# Patient Record
Sex: FEMALE | Race: OTHER
[De-identification: names, ages, dates, MRNs, and addresses within clinical notes are randomized per-mention and may not be internally consistent; named-entity substitution may affect disease eponyms.]

---

## 2020-01-23 ENCOUNTER — RESULT REVIEW (OUTPATIENT)
Age: 70
End: 2020-01-23

## 2020-01-23 PROCEDURE — 88321 CONSLTJ&REPRT SLD PREP ELSWR: CPT

## 2020-01-24 ENCOUNTER — OUTPATIENT (OUTPATIENT)
Dept: OUTPATIENT SERVICES | Facility: HOSPITAL | Age: 70
LOS: 1 days | End: 2020-01-24
Payer: MEDICARE

## 2020-01-24 DIAGNOSIS — D05.12 INTRADUCTAL CARCINOMA IN SITU OF LEFT BREAST: ICD-10-CM

## 2020-01-24 LAB — SURGICAL PATHOLOGY STUDY: SIGNIFICANT CHANGE UP

## 2020-01-24 PROCEDURE — 88321 CONSLTJ&REPRT SLD PREP ELSWR: CPT

## 2020-02-07 PROBLEM — Z00.00 ENCOUNTER FOR PREVENTIVE HEALTH EXAMINATION: Status: ACTIVE | Noted: 2020-02-07

## 2020-02-10 ENCOUNTER — APPOINTMENT (OUTPATIENT)
Dept: MAMMOGRAPHY | Facility: HOSPITAL | Age: 70
End: 2020-02-10
Payer: MEDICARE

## 2020-02-10 ENCOUNTER — OUTPATIENT (OUTPATIENT)
Dept: OUTPATIENT SERVICES | Facility: HOSPITAL | Age: 70
LOS: 1 days | End: 2020-02-10
Payer: MEDICARE

## 2020-02-10 DIAGNOSIS — Z98.890 OTHER SPECIFIED POSTPROCEDURAL STATES: Chronic | ICD-10-CM

## 2020-02-10 DIAGNOSIS — Z98.82 BREAST IMPLANT STATUS: Chronic | ICD-10-CM

## 2020-02-10 DIAGNOSIS — Z98.891 HISTORY OF UTERINE SCAR FROM PREVIOUS SURGERY: Chronic | ICD-10-CM

## 2020-02-10 PROCEDURE — 19281 PERQ DEVICE BREAST 1ST IMAG: CPT | Mod: LT

## 2020-02-10 PROCEDURE — C1739: CPT

## 2020-02-10 PROCEDURE — 19281 PERQ DEVICE BREAST 1ST IMAG: CPT

## 2020-02-10 NOTE — ASU PATIENT PROFILE, ADULT - PSH
History of bilateral breast implants    History of     History of surgery  left breast implant abruption

## 2020-02-11 ENCOUNTER — RESULT REVIEW (OUTPATIENT)
Age: 70
End: 2020-02-11

## 2020-02-11 ENCOUNTER — OUTPATIENT (OUTPATIENT)
Dept: INPATIENT UNIT | Facility: HOSPITAL | Age: 70
LOS: 1 days | Discharge: HOME | End: 2020-02-11
Payer: MEDICARE

## 2020-02-11 VITALS
TEMPERATURE: 97 F | SYSTOLIC BLOOD PRESSURE: 109 MMHG | HEIGHT: 61 IN | WEIGHT: 115.3 LBS | OXYGEN SATURATION: 97 % | RESPIRATION RATE: 16 BRPM | HEART RATE: 70 BPM | DIASTOLIC BLOOD PRESSURE: 70 MMHG

## 2020-02-11 VITALS
SYSTOLIC BLOOD PRESSURE: 100 MMHG | OXYGEN SATURATION: 100 % | DIASTOLIC BLOOD PRESSURE: 56 MMHG | HEART RATE: 68 BPM | RESPIRATION RATE: 16 BRPM

## 2020-02-11 DIAGNOSIS — D05.12 INTRADUCTAL CARCINOMA IN SITU OF LEFT BREAST: ICD-10-CM

## 2020-02-11 DIAGNOSIS — Z98.891 HISTORY OF UTERINE SCAR FROM PREVIOUS SURGERY: Chronic | ICD-10-CM

## 2020-02-11 DIAGNOSIS — Z98.890 OTHER SPECIFIED POSTPROCEDURAL STATES: Chronic | ICD-10-CM

## 2020-02-11 DIAGNOSIS — Z98.82 BREAST IMPLANT STATUS: Chronic | ICD-10-CM

## 2020-02-11 LAB — GLUCOSE BLDC GLUCOMTR-MCNC: 95 MG/DL — SIGNIFICANT CHANGE UP (ref 70–99)

## 2020-02-11 PROCEDURE — 88307 TISSUE EXAM BY PATHOLOGIST: CPT | Mod: 26

## 2020-02-11 PROCEDURE — 88305 TISSUE EXAM BY PATHOLOGIST: CPT

## 2020-02-11 PROCEDURE — 88300 SURGICAL PATH GROSS: CPT | Mod: 26

## 2020-02-11 PROCEDURE — 88307 TISSUE EXAM BY PATHOLOGIST: CPT

## 2020-02-11 PROCEDURE — 71045 X-RAY EXAM CHEST 1 VIEW: CPT | Mod: 26

## 2020-02-11 PROCEDURE — 19301 PARTIAL MASTECTOMY: CPT | Mod: LT

## 2020-02-11 PROCEDURE — 82962 GLUCOSE BLOOD TEST: CPT

## 2020-02-11 PROCEDURE — 71045 X-RAY EXAM CHEST 1 VIEW: CPT

## 2020-02-11 PROCEDURE — 88300 SURGICAL PATH GROSS: CPT

## 2020-02-11 PROCEDURE — 19371 PERI-IMPLT CAPSLC BRST COMPL: CPT | Mod: LT,XP

## 2020-02-11 PROCEDURE — 76098 X-RAY EXAM SURGICAL SPECIMEN: CPT | Mod: 26

## 2020-02-11 PROCEDURE — 88305 TISSUE EXAM BY PATHOLOGIST: CPT | Mod: 26

## 2020-02-11 PROCEDURE — 76098 X-RAY EXAM SURGICAL SPECIMEN: CPT

## 2020-02-11 PROCEDURE — 19328 RMVL INTACT BREAST IMPLANT: CPT | Mod: RT,XP

## 2020-02-11 RX ORDER — DOCUSATE SODIUM 100 MG
2 CAPSULE ORAL
Qty: 18 | Refills: 0
Start: 2020-02-11 | End: 2020-02-13

## 2020-02-11 RX ORDER — ACETAMINOPHEN 500 MG
2 TABLET ORAL
Qty: 40 | Refills: 0
Start: 2020-02-11 | End: 2020-02-15

## 2020-02-11 RX ORDER — DIAZEPAM 5 MG
1 TABLET ORAL
Qty: 12 | Refills: 0
Start: 2020-02-11 | End: 2020-02-13

## 2020-02-11 RX ORDER — OXYCODONE HYDROCHLORIDE 5 MG/1
1 TABLET ORAL
Qty: 12 | Refills: 0
Start: 2020-02-11 | End: 2020-02-13

## 2020-02-11 NOTE — BRIEF OPERATIVE NOTE - NSICDXBRIEFPROCEDURE_GEN_ALL_CORE_FT
PROCEDURES:  Breast capsulectomy 11-Feb-2020 10:35:51  Joe De Oliveira  Partial mastectomy 11-Feb-2020 10:35:21  Joe De Oliveira

## 2020-02-11 NOTE — BRIEF OPERATIVE NOTE - OPERATION/FINDINGS
Left breast incisions made along the inferior border. The mass was identified with [place] probe. Confirmed w/ bedside mammography. Scrape margins were excised and sent for pathology. The left capsule was then excised. It's firm attachment to the capsule caused significant oozing, which was cauterized and a 16 Fr MONICA drain was then inserted. The left breast contours were then realigned and closed.     Right breast capsulectomy was performed by Dr. Braxton with endoscopic guidance.

## 2020-02-11 NOTE — BRIEF OPERATIVE NOTE - NSICDXBRIEFPREOP_GEN_ALL_CORE_FT
PRE-OP DIAGNOSIS:  Ductal carcinoma in situ (DCIS) of left breast 11-Feb-2020 10:42:27  Joe De Oliveira

## 2020-02-11 NOTE — PACU DISCHARGE NOTE - COMMENTS
pt in chair instrction given by MS BRITTNY CABA PAIN FREE AOX4 DRESSING DRY AND CLEANATE CRACKER WITHOUT ANY NAUSEA.

## 2020-02-20 PROBLEM — R73.03 PREDIABETES: Chronic | Status: ACTIVE | Noted: 2020-02-10

## 2020-02-20 PROBLEM — D64.9 ANEMIA, UNSPECIFIED: Chronic | Status: ACTIVE | Noted: 2020-02-10

## 2020-02-25 ENCOUNTER — RESULT REVIEW (OUTPATIENT)
Age: 70
End: 2020-02-25

## 2020-02-25 ENCOUNTER — OUTPATIENT (OUTPATIENT)
Dept: OUTPATIENT SERVICES | Facility: HOSPITAL | Age: 70
LOS: 1 days | Discharge: HOME | End: 2020-02-25
Payer: MEDICARE

## 2020-02-25 VITALS
SYSTOLIC BLOOD PRESSURE: 85 MMHG | DIASTOLIC BLOOD PRESSURE: 56 MMHG | WEIGHT: 112.66 LBS | TEMPERATURE: 98 F | RESPIRATION RATE: 16 BRPM | HEIGHT: 62 IN | OXYGEN SATURATION: 99 % | HEART RATE: 77 BPM

## 2020-02-25 VITALS
RESPIRATION RATE: 16 BRPM | DIASTOLIC BLOOD PRESSURE: 59 MMHG | SYSTOLIC BLOOD PRESSURE: 90 MMHG | HEART RATE: 84 BPM | OXYGEN SATURATION: 100 %

## 2020-02-25 DIAGNOSIS — Z98.891 HISTORY OF UTERINE SCAR FROM PREVIOUS SURGERY: Chronic | ICD-10-CM

## 2020-02-25 DIAGNOSIS — Z98.82 BREAST IMPLANT STATUS: Chronic | ICD-10-CM

## 2020-02-25 DIAGNOSIS — Z98.890 OTHER SPECIFIED POSTPROCEDURAL STATES: Chronic | ICD-10-CM

## 2020-02-25 LAB — GLUCOSE BLDC GLUCOMTR-MCNC: 95 MG/DL — SIGNIFICANT CHANGE UP (ref 70–99)

## 2020-02-25 PROCEDURE — 88307 TISSUE EXAM BY PATHOLOGIST: CPT

## 2020-02-25 PROCEDURE — 82962 GLUCOSE BLOOD TEST: CPT

## 2020-02-25 PROCEDURE — 19301 PARTIAL MASTECTOMY: CPT | Mod: 58,LT

## 2020-02-25 PROCEDURE — 88307 TISSUE EXAM BY PATHOLOGIST: CPT | Mod: 26

## 2020-02-25 RX ORDER — ACETAMINOPHEN 500 MG
650 TABLET ORAL EVERY 6 HOURS
Refills: 0 | Status: DISCONTINUED | OUTPATIENT
Start: 2020-02-25 | End: 2020-02-25

## 2020-02-25 RX ORDER — ONDANSETRON 8 MG/1
4 TABLET, FILM COATED ORAL EVERY 6 HOURS
Refills: 0 | Status: DISCONTINUED | OUTPATIENT
Start: 2020-02-25 | End: 2020-02-25

## 2020-02-25 RX ORDER — IBUPROFEN 200 MG
400 TABLET ORAL EVERY 6 HOURS
Refills: 0 | Status: DISCONTINUED | OUTPATIENT
Start: 2020-02-25 | End: 2020-02-25

## 2020-02-25 NOTE — BRIEF OPERATIVE NOTE - OPERATION/FINDINGS
Previous incision re-opened at left IMF. Inferior margin of breast excised based on pathology report. Second specimen (superior border of inferior margin) excised as well. NAC dissected from underlying tissue for better alignment and cosmesis. Hemostasis achieved. Incision closed primarily.

## 2020-02-25 NOTE — BRIEF OPERATIVE NOTE - NSICDXBRIEFPOSTOP_GEN_ALL_CORE_FT
POST-OP DIAGNOSIS:  Ductal carcinoma in situ (DCIS) of left breast 25-Feb-2020 14:05:44  Puneet Lopez

## 2020-02-25 NOTE — BRIEF OPERATIVE NOTE - NSICDXBRIEFPREOP_GEN_ALL_CORE_FT
PRE-OP DIAGNOSIS:  Ductal carcinoma in situ (DCIS) of left breast 25-Feb-2020 14:05:34  Puneet Lopez

## 2020-02-27 ENCOUNTER — TRANSCRIPTION ENCOUNTER (OUTPATIENT)
Age: 70
End: 2020-02-27

## 2020-03-02 LAB — SURGICAL PATHOLOGY STUDY: SIGNIFICANT CHANGE UP

## 2020-03-26 NOTE — HISTORY OF PRESENT ILLNESS
[FreeTextEntry1] : Ms. Fiorella Church is a 69 year-old female referred by Dr. Lowe for consideration of radiation therapy for newly diagnosed LEFT breast DCIS.  \par \par She underwent a left partial mastectomy, rearrangement of breast tissue, and explantation of bilateral submuscular implants on 2/11/20 by Dr. Lowe.  Pathology as follows:\par 1. Right breast implant: \par - Breast implant, gross diagnosis.\par 2. Left breast implant with capsule:\par - Breast implant, gross diagnosis.\par - Fibrotic tissue with focal foreign body giant cell reaction.\par 3. Left breast partial mastectomy:\par - Breast tissue with intraductal carcinoma (DCIS.), high\par grade, cribriform and solid types with central necrosis and\par associated calcifications.\par - Closest margins to DCIS- 0.2 cm, medial\par 0.3 cm, inferior\par 0.4 cm, anterior\par 0.5 cm, posterior\par - Remaining margins greater than 1.0 cm from tumor\par - Remaining breast tissue with prior biopsy site changes and usual duct hyperplasia.\par 4. Left breast superior margin:\par - Breast tissue with prior biopsy site changes, fibrocystic changes with focal usual duct hyperplasia.\par - Negative for tumor.\par 5. Left breast, lateral margin:\par - Breast tissue with fibrocystic changes and focal calcifications\par - Negative for tumor.\par 6. Left breast inferior margin:\par - Breast tissue intraductal carcinoma, high grade solid and cribriform type focally extending up to the inked margin and less than 1 mm along a broad front.\par 7. Left breast medial margin:\par -  Breast tissue with intraductal carcinoma, high grade, cribriform and solid types 0.5 cm from resection margin.\par 8. Left breast furthest medial margin:\par - Breast tissue with fibrocystic changes.\par - Negative for tumor.\par 9. Left breast additional capsule:\par - Fibrotic tissue with focal foreign body giant cell reaction.\par \par She underwent re-excision on 2/25/20 by Dr. Lowe.  Pathology as follows:\par 1. Left breast, inferior margin:\par - Benign predominately fatty breast tissue with prior procedure site related changes.\par 2. Left breast, superior aspect of inferior margin:\par - Benign breast tissue with prior procedure site related changes.\par

## 2020-03-27 ENCOUNTER — APPOINTMENT (OUTPATIENT)
Dept: RADIATION ONCOLOGY | Facility: CLINIC | Age: 70
End: 2020-03-27

## 2020-12-21 ENCOUNTER — RESULT REVIEW (OUTPATIENT)
Age: 70
End: 2020-12-21

## 2021-03-24 ENCOUNTER — RESULT REVIEW (OUTPATIENT)
Age: 71
End: 2021-03-24